# Patient Record
(demographics unavailable — no encounter records)

---

## 2025-04-30 NOTE — HISTORY OF PRESENT ILLNESS
[Work related] : work related [5] : 5 [8] : 8 [Stabbing] : stabbing [Tightness] : tightness [Constant] : constant [Household chores] : household chores [Leisure] : leisure [Sleep] : sleep [Rest] : rest [Meds] : meds [Ice] : ice [Sitting] : sitting [Walking] : walking [Full time] : Work status: full time [] : This patient has had an injection before: yes